# Patient Record
(demographics unavailable — no encounter records)

---

## 2025-02-18 NOTE — ASSESSMENT
[Diabetes Foot Care] : diabetes foot care [Long Term Vascular Complications] : long term vascular complications of diabetes [Carbohydrate Consistent Diet] : carbohydrate consistent diet [Importance of Diet and Exercise] : importance of diet and exercise to improve glycemic control, achieve weight loss and improve cardiovascular health [Exercise/Effect on Glucose] : exercise/effect on glucose [Hypoglycemia Management] : hypoglycemia management [Glucagon Use] : glucagon use [Self Monitoring of Blood Glucose] : self monitoring of blood glucose [Retinopathy Screening] : Patient was referred to ophthalmology for retinopathy screening [Diabetic Medications] : Risks and benefits of diabetic medications were discussed [FreeTextEntry1] : 1. PTC. Post-surgical hypothyroidism - cont Synthroid 112 mcg - repeat Thyroid bed/anterior neck US in 07/25 at [facility] - Proper intake of levothyroxine is reviewed in details, including on an empty stomach, with water only, at least one hour before taking any medications, vitamins, or supplements and three-four hours before taking iron or calcium.  2. T2DM - Janumet 50/1000 mg bid, Jardiance 10 mg qd. advised to monitor for yeast infections on Jardiance and get back to us if sees an increased in frequency. Diflucan prn - continue Lipitor 20 mg HS, losartan, HCTZ. - labs today  3. Osteoporosis - on Fosamax since 05/20, but with some BMD worsening - at present , I'd prefer switching to an anabolic therapy followed by reclast or prolia, but she prefers to cont with reclast. infusion # 3 is tentatively in 04/25. Will arrange at [place] - calcium 500 mg bid, extra OTC vitamin D3 2,000 IU/day - continue weight-bearing exercises; advised avoiding high risk sports - dental evaluation advised. - DXA 3 sites in 10/26 - continue vitamin D3 3000 IU/day  RTC 4-6 months.

## 2025-02-18 NOTE — HISTORY OF PRESENT ILLNESS
[FreeTextEntry1] : F/u for multiple issues  *** Feb 18, 2025 ***  has been traveling a lot over the  past year diet is bit off during her travel had a few vaginal yeast infections- managed conservatively. not too bothersome taking Synthroid 112 mcg, Janumet 50/1000 mg bid, Jardiance 10 mg qd, Cozaar 100 mg,  Lipitor 20 mg HS reports fbs- 80's- 90's , ppg upto 120 no recent labs  DXA(10/22/24)- LS (-2.4), LFN (-2.6), RFN  (-2.7), TLH (-2.2), TRH (-2.2), radius 33% (-2.5) DXA(8/31/22)- LS (-2.2), LFN (-2.8), RFN (-3.2)  *** Aug 28, 2024 ***  returned from [place] 10 days ago, with a food poisoning over there, recovered by now doing well otherwise s/p reclast # 2 in 04/24 on Synthroid 137 mcg 6.5/wk, Janumet 50/1000 mg bid, Jardiance 10 mg qd, Cozaar 100 mg,  Lipitor 20 mg HS by report, fbs - 100-110 today ppg- 129  Thyroid US (7/1/24)-status post total thyroidectomy.  Unremarkable thyroid bed.  No lymphadenopathy.  *** Feb 21, 2024 ***  doing great, helping with a granddaughter staying physically active, weight is stable on Synthroid 137 mcg 6.5/wk, Janumet 50/1000 mg bid, Jardiance 10 mg qd, Cozaar 100 mg, switched to Lipitor 20 mg HS reports fbs- 80's- 90's, ppg-  low 100's   *** Jul 24, 2023 ***  feels well, no new c/o retired 2 months ago. helping with a 10-mo old granddaughter received Reclast in 02/23- tolerated well on Synthroid 112 mcg, Janumet 50/1000 mg bid, Jardiance 10 mg qd, Cozaar 100 mg, Fosamax 70 mg qw reports fbs- 80's- 90's  *** Jan 23, 2023 ***  feels well, no new c/o on Synthroid 112 mcg, Janumet 50/1000 mg bid, Jardiance 10 mg qd, Cozaar 100 mg, Fosamax 70 mg qw reports fbs- 80's- 90's  DXA(8/31/22)- LS (-2.2), LFN (-2.8), RFN (-3.2)  *** Jul 18, 2022 ***  feels well, no new c/o on Synthroid 100 mcg, Janumet 50/1000 mg bid, Jardiance 10 mg qd, Cozaar 100 mg, Fosamax 70 mg qw  Thyr US (7/1/22)- s/p total tx. no susp findings. rhTSH WBS (481/22)- stim Tg < 0.2, Tg ab < 20. Neck uptake- 0%  *** Feb 10, 2022 ***  feels well, denies new c/o taking synthroid 112 mcg, Janumet 50/1000 mg bid, Jardiance 10 mg qd, cozaar 100 mg, fosamax 70 mg qw did not do a WBS yet saw Dr. Garay this month, planned for a thyroid US reports fbs-   *** Aug 12, 2021 ***  feels great, lost some weight. scheduled for a cataract sx on synthroid 112 mcg, Janumet 50/1000 mg bid, Jardiance 10 mg qd, cozaar 100 mg, fosamax 70 mg qw reports fbs- 90's- 110's, not checking ppg Thyr US (7/30/21)- s/p total tx. no RAY  *** Apr 07, 2021 ***  feels great on synthroid 112 mcg, Janumet 50/1000 mg bid, Jardiance 10 mg qd, cozaar 100 mg, fosamax 70 mg qw reports fbs- 90's- 110's, not checking ppg  *** Jan 06, 2021 ***  feels great. noticed some hair thinning on synthroid 125 mcg, Janumet 50/1000 mg bid, Jardiance 10 mg qd reports fbs and ppg- 90's- 120's Thyr bed /neck US from 12/20 reportedly normal, but no report is available to me  *** Oct 05, 2020 ***  on synthroid 125 mcg, Janumet 50/1000 mg bid, Jardiance 10 mg qd rhTSH WBS (9/4/20)- 48hrs uptake 4.3%. Iodine avid tissue  in the anterior neck (anterior and left thyroid bed).  Stim Tg- 101 with neg Tg ab  s/p rhTSH DIAZ with 128.7 mci of I-131 (9/11/20)  post-treatment WBS (9/17/20)- no distant mets  reports fbs- 120's- 140's, ppg- not checking  *** Jul 31, 2020 ***  Saw Dr. Garay. S/p FNA of the left neck nodule- (+) for PTC S/p right hemithyroidectomy  and left neck mass resection on 06/24/20: Pathology - Left neck mass- FVPTC 1.5 cm;  right FVPTC 0.9x0.6x0.3, neg margins, suspected lymphatic invasion. Neg angioinvasion/perineural invasion. +2/3 LN (largest 2 mm) However, the left neck mass was in the strap muscle  The slides were sent for a 2nd opinion [place] [facility] [place] them: Left neck mass- Benign thyroid nodule with prominent stromal metaplasia involving soft tissues. no malignancy. PAX-8 (+), BRAF (-) Right thyroid -No cancer. One node out of two metastatic cancer.   molecular test on the left side was requested- per pathology review, [facility] cut the slides from the block, which could make this tiny tumor less visible on deeper sections.  Discharged on Synthroid 100 mcg. Took calcium and calcitriol for some time, but finished already With occasional numbness in fingers, denies cramps  DXA (5/13/20)- LS (-2.9), right hip (-2.5). no FN reported. radius 33% (-2.1) - started Fosamax 70mg qw in 05/20  Thyr US (5/13/20)- RUP cystic 0.3 + calcification 0.3; RMLP solid iso 2.7x1.7x1.6. left sided level 3 LN 1.4x0.8x0.8 per d/w radiologist poss abnormal LN vs LN with thin cortex and large fatty hilum   HISTORY OF PRESENT ILLNESS.   Ms. CHRISTINE was diagnosed with Diabetes Mellitus Type 2 in 2013.  Reports history HTN, dyslipidemia, s/p left hemithyroidectomy in 1995 for a presumably benign left thyroid nodule; subsequently hypothyroid. She denies CAD,  known complications of retinopathy, nephropathy, or neuropathy.  Presently on Janumet 50/1000 mg bid, L-thyroxine 75 mcg. She noticed once or twice a week palpitations, mostly in the daytime when she's off work, that started after several months of starting Janumet . She did not have any cardiac work up yet. Blood sugars are checked once to twice a day.  Did not bring log book, but reported are typically as following: FBS-  low 100's.  Diet: not following ADA Exercise: none. 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 No repeat labs since that time.  ***  Last dilated eye - 9/24 Last podiatry visit  - 2018 Last cardiology evaluation - none Last stress test - none Last 2-D Echo - none Last nephrology evaluation - none Last neurology evaluation- none

## 2025-02-18 NOTE — REASON FOR VISIT
[Follow - Up] : a follow-up visit [DM Type 2] : DM Type 2 [Hypothyroidism] : hypothyroidism [Osteoporosis] : osteoporosis [Thyroid Cancer] : thyroid cancer